# Patient Record
Sex: FEMALE | Race: WHITE | NOT HISPANIC OR LATINO
[De-identification: names, ages, dates, MRNs, and addresses within clinical notes are randomized per-mention and may not be internally consistent; named-entity substitution may affect disease eponyms.]

---

## 2017-02-13 ENCOUNTER — RECORDS - HEALTHEAST (OUTPATIENT)
Dept: ADMINISTRATIVE | Facility: OTHER | Age: 72
End: 2017-02-13

## 2017-02-14 ENCOUNTER — COMMUNICATION - HEALTHEAST (OUTPATIENT)
Dept: FAMILY MEDICINE | Facility: CLINIC | Age: 72
End: 2017-02-14

## 2017-02-14 DIAGNOSIS — M81.0 OSTEOPOROSIS: ICD-10-CM

## 2017-02-16 RX ORDER — ALENDRONATE SODIUM 70 MG/1
70 TABLET ORAL
Qty: 12 TABLET | Refills: 3 | Status: SHIPPED | OUTPATIENT
Start: 2017-02-16

## 2017-04-17 ENCOUNTER — OFFICE VISIT - HEALTHEAST (OUTPATIENT)
Dept: FAMILY MEDICINE | Facility: CLINIC | Age: 72
End: 2017-04-17

## 2017-04-17 ENCOUNTER — AMBULATORY - HEALTHEAST (OUTPATIENT)
Dept: FAMILY MEDICINE | Facility: CLINIC | Age: 72
End: 2017-04-17

## 2017-04-17 DIAGNOSIS — E78.00 PURE HYPERCHOLESTEROLEMIA: ICD-10-CM

## 2017-04-17 DIAGNOSIS — M81.0 OSTEOPOROSIS: ICD-10-CM

## 2017-04-17 DIAGNOSIS — R00.2 PALPITATIONS: ICD-10-CM

## 2017-04-17 DIAGNOSIS — F43.9 STRESS: ICD-10-CM

## 2017-04-17 DIAGNOSIS — Z00.00 HEALTH CARE MAINTENANCE: ICD-10-CM

## 2017-04-17 LAB
CHOLEST SERPL-MCNC: 254 MG/DL
FASTING STATUS PATIENT QL REPORTED: YES
HDLC SERPL-MCNC: 73 MG/DL
LDLC SERPL CALC-MCNC: 167 MG/DL
TRIGL SERPL-MCNC: 69 MG/DL

## 2017-04-17 RX ORDER — ATORVASTATIN CALCIUM 20 MG/1
20 TABLET, FILM COATED ORAL DAILY
Qty: 90 TABLET | Refills: 3 | Status: SHIPPED | OUTPATIENT
Start: 2017-04-17

## 2017-04-17 ASSESSMENT — MIFFLIN-ST. JEOR: SCORE: 1172.41

## 2017-04-18 ENCOUNTER — COMMUNICATION - HEALTHEAST (OUTPATIENT)
Dept: FAMILY MEDICINE | Facility: CLINIC | Age: 72
End: 2017-04-18

## 2017-05-09 ENCOUNTER — RECORDS - HEALTHEAST (OUTPATIENT)
Dept: BONE DENSITY | Facility: CLINIC | Age: 72
End: 2017-05-09

## 2017-05-09 ENCOUNTER — RECORDS - HEALTHEAST (OUTPATIENT)
Dept: ADMINISTRATIVE | Facility: OTHER | Age: 72
End: 2017-05-09

## 2017-05-09 DIAGNOSIS — M81.0 AGE-RELATED OSTEOPOROSIS WITHOUT CURRENT PATHOLOGICAL FRACTURE: ICD-10-CM

## 2017-08-14 ENCOUNTER — COMMUNICATION - HEALTHEAST (OUTPATIENT)
Dept: FAMILY MEDICINE | Facility: CLINIC | Age: 72
End: 2017-08-14

## 2017-08-14 ENCOUNTER — AMBULATORY - HEALTHEAST (OUTPATIENT)
Dept: LAB | Facility: CLINIC | Age: 72
End: 2017-08-14

## 2017-08-14 DIAGNOSIS — E78.00 PURE HYPERCHOLESTEROLEMIA: ICD-10-CM

## 2017-08-14 LAB
CHOLEST SERPL-MCNC: 178 MG/DL
FASTING STATUS PATIENT QL REPORTED: YES
HDLC SERPL-MCNC: 61 MG/DL
LDLC SERPL CALC-MCNC: 99 MG/DL
TRIGL SERPL-MCNC: 92 MG/DL

## 2017-10-04 ENCOUNTER — COMMUNICATION - HEALTHEAST (OUTPATIENT)
Dept: FAMILY MEDICINE | Facility: CLINIC | Age: 72
End: 2017-10-04

## 2017-10-18 ENCOUNTER — OFFICE VISIT - HEALTHEAST (OUTPATIENT)
Dept: FAMILY MEDICINE | Facility: CLINIC | Age: 72
End: 2017-10-18

## 2017-10-18 DIAGNOSIS — M72.2 PLANTAR FASCIITIS OF RIGHT FOOT: ICD-10-CM

## 2017-10-18 DIAGNOSIS — R00.2 PALPITATIONS: ICD-10-CM

## 2017-10-18 DIAGNOSIS — E78.00 PURE HYPERCHOLESTEROLEMIA: ICD-10-CM

## 2017-10-18 DIAGNOSIS — E66.3 OVERWEIGHT (BMI 25.0-29.9): ICD-10-CM

## 2017-11-03 ENCOUNTER — COMMUNICATION - HEALTHEAST (OUTPATIENT)
Dept: FAMILY MEDICINE | Facility: CLINIC | Age: 72
End: 2017-11-03

## 2017-11-03 DIAGNOSIS — Z12.39 SCREENING FOR BREAST CANCER: ICD-10-CM

## 2017-11-03 DIAGNOSIS — Z12.31 VISIT FOR SCREENING MAMMOGRAM: ICD-10-CM

## 2017-11-15 ENCOUNTER — COMMUNICATION - HEALTHEAST (OUTPATIENT)
Dept: FAMILY MEDICINE | Facility: CLINIC | Age: 72
End: 2017-11-15

## 2017-11-22 ENCOUNTER — HOSPITAL ENCOUNTER (OUTPATIENT)
Dept: MAMMOGRAPHY | Facility: HOSPITAL | Age: 72
Discharge: HOME | End: 2017-11-22
Attending: FAMILY MEDICINE

## 2017-11-22 DIAGNOSIS — Z12.31 VISIT FOR SCREENING MAMMOGRAM: ICD-10-CM

## 2021-05-24 ENCOUNTER — RECORDS - HEALTHEAST (OUTPATIENT)
Dept: ADMINISTRATIVE | Facility: CLINIC | Age: 76
End: 2021-05-24

## 2021-05-25 ENCOUNTER — RECORDS - HEALTHEAST (OUTPATIENT)
Dept: ADMINISTRATIVE | Facility: CLINIC | Age: 76
End: 2021-05-25

## 2021-05-26 ENCOUNTER — RECORDS - HEALTHEAST (OUTPATIENT)
Dept: ADMINISTRATIVE | Facility: CLINIC | Age: 76
End: 2021-05-26

## 2021-05-27 ENCOUNTER — RECORDS - HEALTHEAST (OUTPATIENT)
Dept: ADMINISTRATIVE | Facility: CLINIC | Age: 76
End: 2021-05-27

## 2021-05-28 ENCOUNTER — RECORDS - HEALTHEAST (OUTPATIENT)
Dept: ADMINISTRATIVE | Facility: CLINIC | Age: 76
End: 2021-05-28

## 2021-05-29 ENCOUNTER — RECORDS - HEALTHEAST (OUTPATIENT)
Dept: ADMINISTRATIVE | Facility: CLINIC | Age: 76
End: 2021-05-29

## 2021-05-30 VITALS — HEIGHT: 65 IN | BODY MASS INDEX: 25.33 KG/M2 | WEIGHT: 152 LBS

## 2021-05-31 ENCOUNTER — RECORDS - HEALTHEAST (OUTPATIENT)
Dept: ADMINISTRATIVE | Facility: CLINIC | Age: 76
End: 2021-05-31

## 2021-05-31 VITALS — WEIGHT: 164.13 LBS | BODY MASS INDEX: 27.74 KG/M2

## 2021-06-01 ENCOUNTER — RECORDS - HEALTHEAST (OUTPATIENT)
Dept: ADMINISTRATIVE | Facility: CLINIC | Age: 76
End: 2021-06-01

## 2021-06-02 ENCOUNTER — RECORDS - HEALTHEAST (OUTPATIENT)
Dept: ADMINISTRATIVE | Facility: CLINIC | Age: 76
End: 2021-06-02

## 2021-06-10 NOTE — PROGRESS NOTES
Assessment/Plan:    1. Health care maintenance  Immunizations reviewed and utd  Colonosocopy reviewed normal 2/17, recheck 10 years  Mammography reviewed normal 11/16  Routine Dental and Eye care recommended  Discussed importance of regular exercise and appropriate calcium intake  Discussed Advance Directives    - Lipid Cascade    2. Palpitations (Symptom)  Stable w/ current medication---refill and check routine labs; pt will be advised of results when available  - atenolol (TENORMIN) 50 MG tablet; Take 1 tablet (50 mg total) by mouth daily.  Dispense: 90 tablet; Refill: 3  - Comprehensive Metabolic Panel    3. Osteoporosis  Recommend calcium intake (12-1500mg/day) with vitamin D (3727-5381 IU daily) as well as weight bearing exercise to include strength and balance.  Calcium supplement should be calcium CITRATE  Continue with Alendronate and recheck DEXA  - DXA Bone Density Scan; Future    4. Stress  Spent time discussing stress management given patient's multiple stressors currently.  Want to ensure given her history of alcohol overuse that she does not depend on this for stress management.  We talked about the importance of adequate sleep, proper diet, regular exercise.  She does not feel she needs to consider meeting with a counselor.    We briefly also talk about medial epicondylitis and management with trying to avoid lifting heavy things unless they are held close to her body and monitoring of her swelling/redness near her right great toe.  If either the symptoms get worse or fail to improve, she has been advised to come back and address.    Pt states an understanding and agrees with the above plan.          Health Maintenance   Topic Date Due     FALL RISK ASSESSMENT  01/11/2010     INFLUENZA VACCINE RULE BASED (1) 08/01/2016     DXA SCAN  04/14/2017     MAMMOGRAM  11/21/2018     TD 18+ HE  11/10/2020     ADVANCE DIRECTIVES DISCUSSED WITH PATIENT  01/18/2021     COLONOSCOPY  02/13/2027     PNEUMOCOCCAL  "POLYSACCHARIDE VACCINE AGE 65 AND OVER  Completed     PNEUMOCOCCAL CONJUGATE VACCINE FOR ADULTS (PCV13 OR PREVNAR)  Completed     ZOSTER VACCINE  Completed         HPI    Patient is a 72 y.o. female presents for a physical exam.  She has a history of some palpitations for which she takes atenolol.  She reports this is going well and she has very infrequent episodes that are brief.  She notes her blood pressure is good and she denies chest pain, shortness of breath, lower extremity edema, headaches, or vision changes.  She does wear glasses and reports doing routine eye care.  Elisha has a history of osteoporosis.  She is on alendronate and tolerating this well.  She is due for bone density as it has been a couple years.  She continues with additional calcium and vitamin D supplementation.  She does remain active with walking.  Patient complains of some right elbow pain.  It is reproducible over the medial epicondyle.  No history of trauma or injury but does note it is aggravated with carrying heavy things.  She has no joint swelling or loss of range of motion.  She also has a right great toe with some mild erythema that is new.  She thinks this may have been from when she was trimming/cleaning out the toenail and/or aggravated by recent long walk.  No discharge noted, no limitation of activity.  Elisha discussed this today significant stressors.  It has now been 2 years since her  , she has significant financial stressors and is moving for the third time to hopefully help with overall finances.  She has 1 son who has significant alcohol drug issues that has been problematic and another son with a history of testicular cancer that has recently recurred and is in chemo treatment.  Patient herself has had issue with alcohol in the past.  She states she give it up completely for length and did have some wine with dinner yesterday but is very conscious of not using this as an \"self-medication\".  She feels she " has a good support system of friends and family.      The following portions of the patient's history were reviewed and updated as appropriate: allergies, current medications, past family history, past medical history, past social history, past surgical history and problem list.    Review of Systems  Pertinent items are noted in HPI.  A 12 point comprehensive review of systems was negative except as noted.    Immunization History   Administered Date(s) Administered     DT (pediatric) 08/03/2000     Hep A, historic 10/23/2007, 06/05/2008     Hep B, historic 10/28/2008, 12/29/2008, 04/28/2009     Influenza, inj, historic 10/23/2007, 10/28/2008, 09/25/2013     Influenza, seasonal,quad inj 6-35 mos 10/05/2010, 10/08/2012     Pneumo Conj 13-V (2010&after) 01/16/2015     Pneumo Polysac 23-V 10/28/2008     Td, historic 08/03/2000     Tdap 11/10/2010     ZOSTER 10/23/2007     No results found for this or any previous visit (from the past 240 hour(s)).    Patient Active Problem List   Diagnosis     Alcohol Abuse     Alopecia     Menopause Has Occurred     Chronic Reflux Esophagitis     Osteoporosis     Hypercholesterolemia     Palpitations (Symptom)     Ophthalmoplegic Migraine Headache     Diverticulosis     Family History   Problem Relation Age of Onset     Colon cancer Mother 80     Depression Mother      Stroke Father      Alzheimer's disease Father      Hypertension Father      Testicular cancer Son      recurrence/ metastatic     Social History     Social History     Marital status:      Spouse name: N/A     Number of children: N/A     Years of education: N/A     Occupational History     Not on file.     Social History Main Topics     Smoking status: Former Smoker     Smokeless tobacco: Not on file      Comment: quit in 1990     Alcohol use 4.2 oz/week     7 Glasses of wine per week     Drug use: Not on file     Sexual activity: Not on file     Other Topics Concern     Not on file     Social History Narrative  "      Objective:    /62 (Patient Site: Right Arm, Patient Position: Sitting, Cuff Size: Adult Regular)  Pulse 62  Temp 97.5  F (36.4  C) (Oral)   Resp 14  Ht 5' 4.5\" (1.638 m)  Wt 152 lb (68.9 kg)  LMP 02/01/1979  BMI 25.69 kg/m2      General Appearance:    Alert, cooperative, no distress, appears stated age   Head:    Normocephalic, without obvious abnormality, atraumatic   Eyes:    PERRL, conjunctiva/corneas clear, EOM's intact both eyes; +glasses   Ears:    Normal TM's and external ear canals, both ears   Nose:   Nares normal, septum midline, mucosa normal   Throat:   Lips, mucosa, and tongue normal; teeth and gums normal   Neck:   Supple, symmetrical, trachea midline, no adenopathy;     thyroid:  no enlargement/tenderness/nodules; no carotid    bruit or JVD   Back:     Symmetric, no curvature, no CVA tenderness   Lungs:     Clear to auscultation bilaterally, respirations unlabored   Chest Wall:    No tenderness or deformity    Heart:    Regular rate and rhythm, S1 and S2 normal, no murmur, rub   or gallop   Breast Exam:    No tenderness, masses, or nipple abnormality   Abdomen:     Soft, non-tender, bowel sounds active all four quadrants,     no masses, no organomegaly   Genitalia:   external female genitalia examined.  Mild atrophic changes.  Some swelling/cystic changes at the clitoral region but patient is unbothered by this and will contact me if changes.  No lesions or discharge at the introitus.     Rectal:   normal external rectal tissue.   Extremities:   Extremities normal, atraumatic, no cyanosis or edema   Pulses:   2+ and symmetric all extremities   Skin:   Skin color, texture, turgor normal, no rashes or lesions       Neurologic:   CNII-XII intact              "

## 2021-06-13 NOTE — PROGRESS NOTES
ASSESSMENT/PLAN  1. Palpitations (Symptom)  Agree with trial away from medication.  Patient will start with every other day atenolol and then discontinue after 1-2 weeks.  If symptoms return, she will resume.  She will contact me if problems or concerns.  Given that atenolol is a blood pressure medication, would like her to do a follow-up blood pressure check here week or 2 off the medication to ensure blood pressure stable.    2. Plantar fasciitis of right foot  Patient given information on plantar fasciitis and we discussed management including shoe inserts, stretching, icing.  She is coached to avoid walking barefoot.  If she has persisting symptoms, would like her to see podiatry for consultation.    3. Overweight (BMI 25.0-29.9)  With patient's weight increase of 20 pounds, would recommend she focus on weekly weights, exercise, and appropriate food choices.  She defers referral to dietitian at this time.    4. Hypercholesterolemia  Patient will continue with atorvastatin at this time.  We review initial labs and labs after starting cholesterol medication.  I would encourage her with exercise, weight control and diet changes as noted above, but we discussed her LDL elevation is most likely genetic.    The following high BMI interventions were performed this visit: encouragement to exercise and weight monitoring    Pt states an understanding and agrees to the above plan.  Routine follow-up with me in April unless other concerns arise before then.      SUBJECTIVE:   Chief Complaint   Patient presents with     Heel Pain     right heel pain off/on x 3 months     Elisha Cardona 72 y.o. female    Current Outpatient Prescriptions   Medication Sig Dispense Refill     alendronate (FOSAMAX) 70 MG tablet Take 1 tablet (70 mg total) by mouth every 7 days. 12 tablet 3     aspirin 81 MG EC tablet Take 81 mg by mouth daily.       atorvastatin (LIPITOR) 20 MG tablet Take 1 tablet (20 mg total) by mouth daily. 90 tablet 3      calcium carbonate-vitamin D2 500 mg(1,250mg) -200 unit tablet Take 1 tablet by mouth 2 (two) times a day.       cholecalciferol, vitamin D3, 1,000 unit tablet Take 1,000 Units by mouth daily.       No current facility-administered medications for this visit.      Allergies: Tetanus-diphtheria toxoids-td   Patient's last menstrual period was 02/01/1979.    HPI:   Elisha is a 72-year-old female comes in for concern regarding some heel pain.  Patient states it has been present now over the last 3 months or so.  She states it started after she had reinjured her fourth toe on her right foot.  (Patient reports significant injury to that toe several years ago).  She was wearing a larger pair of shoes to allow the toe some extra room while it healed and subsequently started having some heel pain.  There is no other history of injury or trauma to the heel.  She reports 3 weeks ago she started using some inserts in her shoes to give her additional cushioning.  She is not certain it has been helping all that much.  Patient reports symptoms are worse after she has been resting for a period of time and gets up to walk or when she first gets up in the morning.  Symptoms are limited to her right heel.  She has not had any swelling of the foot or other changes.  Patient also has questions about her cholesterol.  We started her on atorvastatin and she had a significant improvement in her LDL.  She is tolerating the medication well but would really like to work on diet and exercise to potentially get off the medication.  With atenolol shortage, patient recently converted to metoprolol.  She did pick the prescription up but has not started it.  She still has some atenolol left.  She is questioning she can do a trial of time away from the atenolol.  It was originally started for some palpitations.  She states she has them very infrequently.  There is no history of problems with high blood pressure. Elisha denies any issues with chest  pain, shortness of breath, lower extremity edema, headaches.    ROS: negative except as per HPI    OBJECTIVE:   The patient appears well, alert, oriented x 3, in no distress.  She ambulates normally here in the clinic.  /64 (Patient Site: Right Arm, Patient Position: Sitting, Cuff Size: Adult Large)  Pulse 68  Temp 97.9  F (36.6  C) (Oral)   Resp 16  Wt 164 lb 2 oz (74.4 kg)  LMP 02/01/1979  BMI 27.74 kg/m2    Lungs: clear, good air entry, no wheezes, rhonchi or rales.   Cardiac: S1 and S2 normal, no murmurs, regular rate and rhythm.   Extremities: show no edema, normal peripheral pulses.   Foot: Right foot shows normal coloration and good peripheral pulses.  She has no significant changes to her toes and maintains full range of motion.  She has reproducible tenderness at the medial to posterior heel with pressure.

## 2021-06-16 PROBLEM — E66.3 OVERWEIGHT: Status: ACTIVE | Noted: 2017-10-18

## 2021-06-16 PROBLEM — Z12.31 VISIT FOR SCREENING MAMMOGRAM: Status: ACTIVE | Noted: 2017-11-06

## 2021-07-13 ENCOUNTER — RECORDS - HEALTHEAST (OUTPATIENT)
Dept: ADMINISTRATIVE | Facility: CLINIC | Age: 76
End: 2021-07-13

## 2021-07-21 ENCOUNTER — RECORDS - HEALTHEAST (OUTPATIENT)
Dept: ADMINISTRATIVE | Facility: CLINIC | Age: 76
End: 2021-07-21

## 2021-07-22 ENCOUNTER — RECORDS - HEALTHEAST (OUTPATIENT)
Dept: FAMILY MEDICINE | Facility: CLINIC | Age: 76
End: 2021-07-22

## 2021-07-22 DIAGNOSIS — Z12.31 OTHER SCREENING MAMMOGRAM: ICD-10-CM
